# Patient Record
Sex: FEMALE | Race: WHITE | URBAN - METROPOLITAN AREA
[De-identification: names, ages, dates, MRNs, and addresses within clinical notes are randomized per-mention and may not be internally consistent; named-entity substitution may affect disease eponyms.]

---

## 2022-04-29 ENCOUNTER — OFFICE VISIT (OUTPATIENT)
Dept: INTERNAL MEDICINE CLINIC | Age: 22
End: 2022-04-29
Payer: COMMERCIAL

## 2022-04-29 VITALS
HEART RATE: 91 BPM | DIASTOLIC BLOOD PRESSURE: 81 MMHG | SYSTOLIC BLOOD PRESSURE: 136 MMHG | WEIGHT: 260 LBS | HEIGHT: 68 IN | BODY MASS INDEX: 39.4 KG/M2 | OXYGEN SATURATION: 99 %

## 2022-04-29 DIAGNOSIS — E66.09 CLASS 2 OBESITY DUE TO EXCESS CALORIES WITHOUT SERIOUS COMORBIDITY WITH BODY MASS INDEX (BMI) OF 35.0 TO 35.9 IN ADULT: ICD-10-CM

## 2022-04-29 DIAGNOSIS — M54.50 CHRONIC BILATERAL LOW BACK PAIN WITHOUT SCIATICA: Primary | ICD-10-CM

## 2022-04-29 DIAGNOSIS — G89.29 CHRONIC BILATERAL LOW BACK PAIN WITHOUT SCIATICA: Primary | ICD-10-CM

## 2022-04-29 PROBLEM — E66.812 CLASS 2 OBESITY DUE TO EXCESS CALORIES WITHOUT SERIOUS COMORBIDITY WITH BODY MASS INDEX (BMI) OF 35.0 TO 35.9 IN ADULT: Status: ACTIVE | Noted: 2022-04-29

## 2022-04-29 PROCEDURE — 1036F TOBACCO NON-USER: CPT | Performed by: FAMILY MEDICINE

## 2022-04-29 PROCEDURE — G8427 DOCREV CUR MEDS BY ELIG CLIN: HCPCS | Performed by: FAMILY MEDICINE

## 2022-04-29 PROCEDURE — G8417 CALC BMI ABV UP PARAM F/U: HCPCS | Performed by: FAMILY MEDICINE

## 2022-04-29 PROCEDURE — 99203 OFFICE O/P NEW LOW 30 MIN: CPT | Performed by: FAMILY MEDICINE

## 2022-04-29 RX ORDER — CYCLOBENZAPRINE HCL 5 MG
TABLET ORAL
COMMUNITY
Start: 2022-03-31

## 2022-04-29 ASSESSMENT — ENCOUNTER SYMPTOMS
DIARRHEA: 0
SHORTNESS OF BREATH: 0
CONSTIPATION: 0
VOMITING: 0
COUGH: 0
NAUSEA: 0

## 2022-04-29 ASSESSMENT — PATIENT HEALTH QUESTIONNAIRE - PHQ9
1. LITTLE INTEREST OR PLEASURE IN DOING THINGS: 2
2. FEELING DOWN, DEPRESSED OR HOPELESS: 0
SUM OF ALL RESPONSES TO PHQ QUESTIONS 1-9: 2
SUM OF ALL RESPONSES TO PHQ9 QUESTIONS 1 & 2: 2

## 2022-04-29 NOTE — PROGRESS NOTES
No results found for: LABA1C  No results found for: WBC, HGB, HCT, MCV, PLT   No results found for: NA, K, CL, CO2, BUN, CREATININE, GLUCOSE, CALCIUM    No results found for: CHOL  No results found for: TRIG  No results found for: HDL  No results found for: LDLCHOLESTEROL, LDLCALC  No results found for: LABVLDL, VLDL  No results found for: CHOLHDLRATIO     The ASCVD Risk score (Ariana Angel, et al., 2013) failed to calculate for the following reasons: The 2013 ASCVD risk score is only valid for ages 36 to 78     Patient received counseling and, if relevant, printed instructions for all symptoms listed in CC and HPI, as well as for all diagnoses brought onto today's visit note below. Typical counseling includes, but is not limited to, non-pharmacologic measures to manage listed symptoms and conditions; appropriate use, risks and benefits for all prescribed medications; potential interactions between medications both prescribed and OTC; diet; exercise; healthy behaviors; and goalsetting to improve health. Patient or responsible party was involved in shared decision making and had opportunity to have all questions answered. Except as noted below, all chronic problems have been reviewed and are stable to continue medications or other therapy as previously documented in the patient's chart, with changes per orders or documentation below:    1. Chronic bilateral low back pain without sciatica  2. Class 2 obesity due to excess calories without serious comorbidity with body mass index (BMI) of 35.0 to 35.9 in adult        Problem List        Medium    Chronic bilateral low back pain without sciatica - Primary    Relevant Medications    cyclobenzaprine (FLEXERIL) 5 MG tablet    Class 2 obesity due to excess calories without serious comorbidity with body mass index (BMI) of 35.0 to 35.9 in adult        No orders of the defined types were placed in this encounter.       Return in about 5 months (around 9/29/2022) for follow up, eval low back pain. [facility] - Internal Medicine and Pediatrics  Dr. Suleiman Rod D.O.  - Family Medicine and OMT      Usual doctor's hours are:     Monday 7:30 am to 6:00 pm           Tuesday  7:30 am to 5:00 pm                                               Wednesday 7:30 am to 5:00 pm                                               Thursday 7:30 am to 5:00 pm                                               Friday 7:30 am to 4:00 pm           Saturdays, Sundays, and after hours: E-Visits are available    We observe most federal holidays and Good Friday. We ask that you only contact the office one time per issue or question, and please allow one full business day for a call back. Calling us back multiple times keeps us from being able to complete the work efficiently for you and our other patients. For medication renewals, please call your pharmacist to contact us, and be sure to allow at least 3 business days for processing before you need to  your medication. If you are sick or need an appointment that hasn't been planned, same day appointments are available every day the office is open: Monday, Tuesday, Wednesday, Thursday, and Friday. Call during office hours to schedule, even if it may not be with your regular physician. You may also call the office after 8 am on office days if you need to be seen from an issue the night before. During hours when the office is not normally open, your call will go to the messaging service which cannot provide any service other than paging the doctor. No prescriptions or other nonurgent matters will be handled and no voicemail is available, so please call back during office hours for these matters.        Electronically signed by Suleiman Rod DO on 4/29/2022 at 11:34 AM.

## 2022-05-22 PROCEDURE — 99283 EMERGENCY DEPT VISIT LOW MDM: CPT

## 2022-05-23 ENCOUNTER — HOSPITAL ENCOUNTER (EMERGENCY)
Age: 22
Discharge: HOME | End: 2022-05-23
Attending: EMERGENCY MEDICINE
Payer: COMMERCIAL

## 2022-05-23 VITALS
BODY MASS INDEX: 39.4 KG/M2 | SYSTOLIC BLOOD PRESSURE: 136 MMHG | RESPIRATION RATE: 14 BRPM | HEART RATE: 82 BPM | OXYGEN SATURATION: 99 % | WEIGHT: 260 LBS | TEMPERATURE: 98 F | HEIGHT: 68 IN | DIASTOLIC BLOOD PRESSURE: 68 MMHG

## 2022-05-23 DIAGNOSIS — R52 BODY ACHES: Primary | ICD-10-CM

## 2022-05-23 DIAGNOSIS — R53.81 MALAISE AND FATIGUE: ICD-10-CM

## 2022-05-23 DIAGNOSIS — R53.83 MALAISE AND FATIGUE: ICD-10-CM

## 2022-05-23 LAB
RAPID INFLUENZA  B AGN: NEGATIVE
RAPID INFLUENZA A AGN: NEGATIVE
SARS-COV-2, NAAT: NOT DETECTED

## 2022-05-23 PROCEDURE — 87635 SARS-COV-2 COVID-19 AMP PRB: CPT

## 2022-05-23 PROCEDURE — 6370000000 HC RX 637 (ALT 250 FOR IP): Performed by: EMERGENCY MEDICINE

## 2022-05-23 PROCEDURE — 87804 INFLUENZA ASSAY W/OPTIC: CPT

## 2022-05-23 RX ORDER — IBUPROFEN 600 MG/1
600 TABLET ORAL ONCE
Status: COMPLETED | OUTPATIENT
Start: 2022-05-23 | End: 2022-05-23

## 2022-05-23 RX ORDER — IBUPROFEN 200 MG
600 TABLET ORAL EVERY 8 HOURS PRN
Qty: 60 TABLET | Refills: 0 | Status: SHIPPED | OUTPATIENT
Start: 2022-05-23

## 2022-05-23 RX ADMIN — IBUPROFEN 600 MG: 600 TABLET ORAL at 01:41

## 2022-05-23 ASSESSMENT — PAIN - FUNCTIONAL ASSESSMENT
PAIN_FUNCTIONAL_ASSESSMENT: 0-10
PAIN_FUNCTIONAL_ASSESSMENT: ACTIVITIES ARE NOT PREVENTED
PAIN_FUNCTIONAL_ASSESSMENT: 0-10

## 2022-05-23 ASSESSMENT — PAIN DESCRIPTION - LOCATION
LOCATION: GENERALIZED
LOCATION: GENERALIZED

## 2022-05-23 ASSESSMENT — PAIN SCALES - GENERAL
PAINLEVEL_OUTOF10: 5
PAINLEVEL_OUTOF10: 4

## 2022-05-23 ASSESSMENT — PAIN DESCRIPTION - FREQUENCY: FREQUENCY: CONTINUOUS

## 2022-05-23 ASSESSMENT — PAIN DESCRIPTION - DESCRIPTORS
DESCRIPTORS: ACHING
DESCRIPTORS: ACHING

## 2022-05-23 ASSESSMENT — PAIN DESCRIPTION - PAIN TYPE: TYPE: ACUTE PAIN

## 2022-05-23 ASSESSMENT — LIFESTYLE VARIABLES: HOW OFTEN DO YOU HAVE A DRINK CONTAINING ALCOHOL: NEVER

## 2022-05-23 ASSESSMENT — PAIN DESCRIPTION - ONSET: ONSET: PROGRESSIVE

## 2022-05-23 NOTE — Clinical Note
Danuta Aj was seen and treated in our emergency department on 5/22/2022. She may return to work on 05/24/2022. If you have any questions or concerns, please don't hesitate to call.       Josesito Mathias MD

## 2022-05-23 NOTE — ED NOTES
Dr. Nadira Cheatham in room to discuss test results and discharge plan of care with patient.       Tyrese Alonso RN  05/23/22 0126

## 2022-05-23 NOTE — ED TRIAGE NOTES
Patient ambulatory to Room 5 with c/o generalized body aches and chills. She states symptoms started this morning and she came to the ER because she is afraid she will make her 3year old child sick. She is awake, alert, oriented, respirations easy & regular, skin w/d, MMM & pink, cap refill brisk. Afebrile during triage. Patient denies nausea, vomiting and diarrhea. States no know covid exposures.

## 2022-05-23 NOTE — ED NOTES
Discharge instructions reviewed with patient and verbalized understanding, denies further questions and successful teach back occurred. Offered wheelchair for discharge and declined. Discharged ambulatory with steady gait to ED lobby.   Written discharge instructions, work note, and prescription x1 provided to patient     Italia Chowdary RN  05/23/22 3457

## 2022-05-23 NOTE — ED PROVIDER NOTES
[facility]  Chief Complaint   Patient presents with    Generalized Body Aches     Patient with c/o body aches and chills that started this morning     HISTORY OF PRESENT ILLNESS  Yifan Flores is a 19 y.o. female who presents to the ED complaining of what started earlier today as a hoarse voice and scratchy throat. She then subsequently had body aches malaise fatigue and some chills. Some poor appetite but no vomiting or diarrhea. No significant coughing or sputum expectoration, no shortness of breath or chest pain, no belly pain vomiting or diarrhea. She is unvaccinated against COVID and flu. No other complaints, modifying factors or associated symptoms. Nursing notes reviewed. History reviewed. No pertinent past medical history. Past Surgical History:   Procedure Laterality Date    CHOLECYSTECTOMY      URETER SURGERY       History reviewed. No pertinent family history. Social History     Socioeconomic History    Marital status:      Spouse name: Not on file    Number of children: Not on file    Years of education: Not on file    Highest education level: Not on file   Occupational History    Not on file   Tobacco Use    Smoking status: Never Smoker    Smokeless tobacco: Never Used   Vaping Use    Vaping Use: Never used   Substance and Sexual Activity    Alcohol use: Never    Drug use: Never    Sexual activity: Yes     Partners: Male   Other Topics Concern    Not on file   Social History Narrative    Not on file     Social Determinants of Health     Financial Resource Strain:     Difficulty of Paying Living Expenses: Not on file   Food Insecurity:     Worried About Running Out of Food in the Last Year: Not on file    Liyah of Food in the Last Year: Not on file   Transportation Needs:     Lack of Transportation (Medical): Not on file    Lack of Transportation (Non-Medical):  Not on file   Physical Activity:     Days of Exercise per Week: Not on file    Minutes of Exercise per Session: Not on file   Stress:     Feeling of Stress : Not on file   Social Connections:     Frequency of Communication with Friends and Family: Not on file    Frequency of Social Gatherings with Friends and Family: Not on file    Attends [employer] Services: Not on file    Active Member of [address] [place] [address] [employer] or Organizations: Not on file    Attends Club or Organization Meetings: Not on file    Marital Status: Not on file   Intimate Partner Violence:     Fear of Current or Ex-Partner: Not on file    Emotionally Abused: Not on file    Physically Abused: Not on file    Sexually Abused: Not on file   Housing Stability:     Unable to Pay for Housing in the Last Year: Not on file    Number of [place] in the Last Year: Not on file    Unstable Housing in the Last Year: Not on file     No current facility-administered medications for this encounter. Current Outpatient Medications   Medication Sig Dispense Refill    ibuprofen (ADVIL) 200 MG tablet Take 3 tablets by mouth every 8 hours as needed for Pain 60 tablet 0    cyclobenzaprine (FLEXERIL) 5 MG tablet  (Patient not taking: Reported on 5/23/2022)       No Known Allergies    REVIEW OF SYSTEMS  6 systems reviewed, pertinent positives per HPI otherwise noted to be negative    PHYSICAL EXAM   BP (!) 150/89   Pulse 89   Temp 98 °F (36.7 °C) (Oral)   Resp 16   Ht 5' 8\" (1.727 m)   Wt 260 lb (117.9 kg)   SpO2 98%   BMI 39.53 kg/m²    GENERAL APPEARANCE: Awake and alert. Cooperative. No acute distress. HEAD: Normocephalic. Atraumatic. EYES: PERRL. EOM's grossly intact. ENT: Mucous membranes are moist. Oropharynx without erythema/exudate, mild nasal congestion noted. NECK: Supple. Normal ROM. No  LAD. CHEST: Equal symmetric chest rise. RRR  LUNGS: Breathing is unlabored. Speaking comfortably in full sentences. CTAB, no wheezing rhonchi or rales.   ABDOMEN: Nondistended, nontender  EXTREMITIES: MAEE. No acute deformities. SKIN: Warm and dry. No acute rashes. NEUROLOGICAL: Alert and oriented. Strength is 5/5 in all extremities and sensation is intact. LABS  I have reviewed all labs for this visit. Results for orders placed or performed during the hospital encounter of 05/23/22   Rapid influenza A/B antigens    Specimen: Nasopharyngeal   Result Value Ref Range    Rapid Influenza A Ag Negative Negative    Rapid Influenza B Ag Negative Negative   COVID-19, Rapid    Specimen: Nasopharyngeal Swab   Result Value Ref Range    SARS-CoV-2, NAAT Not Detected Not Detected     ED COURSE/MDM  The patient's ED course was notable for some viral syndrome symptoms. Flu and COVID are both negative. No specific cough symptomatology and lungs are clear so holding off on x-ray for now. No indication for antibiotics, patient is afebrile here with overall fairly reassuring vital signs. Not tachycardic tachypneic or hypoxic. Not hypotensive. Suspect viral syndrome although cautioned to return to the ED with any rapidly new or worsening symptoms. Oropharyngeal exam unremarkable not consistent with strep pharyngitis. Recommended close PCP follow-up and will prescribe NSAIDs. She has Tylenol at home which she can supplement with as well. Patient was given scripts for the following medications. I counseled patient how to take these medications. New Prescriptions    IBUPROFEN (ADVIL) 200 MG TABLET    Take 3 tablets by mouth every 8 hours as needed for Pain         CLINICAL IMPRESSION  1. Body aches    2. Malaise and fatigue        Blood pressure (!) 150/89, pulse 89, temperature 98 °F (36.7 °C), temperature source Oral, resp. rate 16, height 5' 8\" (1.727 m), weight 260 lb (117.9 kg), SpO2 98 %. DISPOSITION    I have discussed the findings of today's workup with the patient and addressed the patient's questions and concerns.   Important warning signs as well as new or worsening symptoms which would necessitate immediate return to the ED were discussed. The plan is to discharge from the ED at this time, and the patient is in stable condition. The patient acknowledged understanding is agreeable with this plan. Follow-up with:  Bety Garza [address] [id number] [address] [place] [address]  [phone]    Schedule an appointment as soon as possible for a visit in 3 days  For symptom re-evaluation    [place] [id number] [phone]  Go to   If symptoms worsen      This chart was created using Dragon dictation software. Efforts were made by me to ensure accuracy, however some errors may be present due to limitations of this technology.         Adilene Agueor MD  05/23/22 1743